# Patient Record
Sex: MALE | Race: WHITE | NOT HISPANIC OR LATINO | Employment: FULL TIME | URBAN - METROPOLITAN AREA
[De-identification: names, ages, dates, MRNs, and addresses within clinical notes are randomized per-mention and may not be internally consistent; named-entity substitution may affect disease eponyms.]

---

## 2019-02-26 ENCOUNTER — OFFICE VISIT (OUTPATIENT)
Dept: GASTROENTEROLOGY | Facility: CLINIC | Age: 64
End: 2019-02-26
Payer: COMMERCIAL

## 2019-02-26 VITALS
DIASTOLIC BLOOD PRESSURE: 80 MMHG | HEART RATE: 78 BPM | HEIGHT: 74 IN | BODY MASS INDEX: 30.8 KG/M2 | SYSTOLIC BLOOD PRESSURE: 136 MMHG | WEIGHT: 240 LBS

## 2019-02-26 DIAGNOSIS — Z12.11 SCREENING FOR COLON CANCER: ICD-10-CM

## 2019-02-26 DIAGNOSIS — K21.9 GASTROESOPHAGEAL REFLUX DISEASE, ESOPHAGITIS PRESENCE NOT SPECIFIED: ICD-10-CM

## 2019-02-26 DIAGNOSIS — K60.2 ANAL FISSURE: Primary | ICD-10-CM

## 2019-02-26 PROCEDURE — 99214 OFFICE O/P EST MOD 30 MIN: CPT | Performed by: INTERNAL MEDICINE

## 2019-02-26 RX ORDER — CALCIUM POLYCARBOPHIL 625 MG
TABLET ORAL 2 TIMES DAILY
COMMUNITY

## 2019-02-26 RX ORDER — PANTOPRAZOLE SODIUM 40 MG/1
20 TABLET, DELAYED RELEASE ORAL DAILY
COMMUNITY
End: 2020-02-12 | Stop reason: SDUPTHER

## 2019-02-26 RX ORDER — CARVEDILOL 6.25 MG/1
6.25 TABLET ORAL 2 TIMES DAILY WITH MEALS
COMMUNITY

## 2019-02-26 RX ORDER — MULTIVITAMIN
1 CAPSULE ORAL DAILY
COMMUNITY

## 2019-02-26 RX ORDER — CETIRIZINE HYDROCHLORIDE 10 MG/1
10 TABLET ORAL DAILY
COMMUNITY

## 2019-02-26 RX ORDER — UREA 10 %
500 LOTION (ML) TOPICAL DAILY
COMMUNITY

## 2019-02-26 RX ORDER — ROSUVASTATIN CALCIUM 20 MG/1
20 TABLET, COATED ORAL DAILY
COMMUNITY

## 2019-02-26 RX ORDER — ASCORBIC ACID 500 MG
500 TABLET ORAL DAILY
COMMUNITY

## 2019-02-26 RX ORDER — OMEGA-3-ACID ETHYL ESTERS 1 G/1
2 CAPSULE, LIQUID FILLED ORAL 2 TIMES DAILY
COMMUNITY

## 2019-02-26 RX ORDER — ASPIRIN 81 MG/1
81 TABLET ORAL DAILY
COMMUNITY

## 2019-02-26 RX ORDER — VITAMIN B COMPLEX
1 CAPSULE ORAL DAILY
COMMUNITY

## 2019-02-26 RX ORDER — LISINOPRIL 5 MG/1
40 TABLET ORAL DAILY
COMMUNITY

## 2019-02-26 RX ORDER — MELATONIN
1000 DAILY
COMMUNITY

## 2019-02-26 NOTE — PROGRESS NOTES
[facility] - Outpatient Follow-up Note  Gaby Benitez 59 y o  male MRN: [id number]  Encounter: [id number]      ASSESSMENT AND PLAN:      1  Anal fissure  No evidence on physical exam today in the office in feels better with nitroglycerin  He probably healed anal fissure I asked him to contact us if his symptoms worsen would re-evaluate him in 3 months  At this point given the negative colonoscopy in 2017 I do not think he needs repeat endoscopy  2  Screening for colon cancer  Negative two thousand seventeen 5 year recall with a history of polyps  3  Gastroesophageal reflux disease, esophagitis presence not specified  Stable symptoms plan at clinically controlled      Followup Appointment[de-identified]  3 month  ______________________________________________________________________    Chief Complaint   Patient presents with    Follow-up     Fissure       HPI:   generally feels better  No pain on defecation but sometimes has pain or pressure slightly before he goes  No rectal bleeding feels fine after a bowel movement his stool is not hard        Historical Information   Past Medical History:   Diagnosis Date    Coronary artery disease     GERD (gastroesophageal reflux disease)     Hyperlipidemia     Hypertension      Past Surgical History:   Procedure Laterality Date    CARPAL TUNNEL RELEASE      CORONARY STENT PLACEMENT       Social History   Social History     Substance and Sexual Activity   Alcohol Use Yes    Alcohol/week: 2 4 oz    Types: 4 Glasses of wine per week    Frequency: 2-3 times a week     Social History     Substance and Sexual Activity   Drug Use Not on file     Social History     Tobacco Use   Smoking Status Former Smoker   Smokeless Tobacco Never Used     Family History   Problem Relation Age of Onset    Heart disease Mother     Stroke Mother     Lung cancer Mother     Heart disease Father     Diabetes Father     Colon cancer Neg Hx     Colon polyps Neg Hx        Meds/Allergies       Current Outpatient Medications:     ascorbic acid (VITAMIN C) 500 mg tablet    aspirin (ECOTRIN LOW STRENGTH) 81 mg EC tablet    b complex vitamins capsule    Calcium Polycarbophil (FIBER) 625 MG TABS    carvedilol (COREG) 6 25 mg tablet    cetirizine (ZyrTEC) 10 mg tablet    cholecalciferol (VITAMIN D3) 1,000 units tablet    Coenzyme Q10 (EQL COQ10) 300 MG CAPS    lisinopril (ZESTRIL) 5 mg tablet    magnesium gluconate (MAGONATE) 500 mg tablet    Multiple Vitamin (MULTIVITAMIN) capsule    omega-3-acid ethyl esters (LOVAZA) 1 g capsule    pantoprazole (PROTONIX) 40 mg tablet    Probiotic Product (PROBIOTIC-10 PO)    rosuvastatin (CRESTOR) 20 MG tablet    Saw Palmetto-Phytosterols (PROSTATE SR) 160-250 MG CAPS    TURMERIC PO    Allergies   Allergen Reactions    Penicillins Hives       10 Point REVIEW OF SYSTEMS IS OTHERWISE NEGATIVE  Objective     Blood pressure 136/80, pulse 78, height 6' 1 5" (1 867 m), weight 109 kg (240 lb)  Body mass index is 31 23 kg/m²  PHYSICAL EXAM:    General Appearance:  Alert, cooperative, no distress  HEENT:  Normocephalic, atraumatic, anicteric  Neck: Supple, symmetrical, trachea midline  Lungs: Clear to auscultation bilaterally; no rales, rhonchi or wheezing; respirations unlabored   Heart: Regular rate and rhythm; no murmur, rub, or gallop  Abdomen:   Soft, non-tender, non-distended; normal bowel sounds; no masses, no organomegaly   Rectal:  Normal with no pain no masses no blood      Extremities:  No cyanosis, clubbing or edema   Skin:  No jaundice, rashes, or lesions   Lymph nodes: No palpable cervical lymphadenopathy     Lab Results:   No results found for: WBC, HGB, HCT, MCV, PLT  No results found for: NA, K, CL, CO2, ANIONGAP, BUN, CREATININE, GLUCOSE, GLUF, CALCIUM, CORRECTEDCA, AST, ALT, ALKPHOS, PROT, BILITOT, EGFR  No results found for: IRON, TIBC, FERRITIN  No results found for: LIPASE      Radiology Results:   No results found

## 2019-02-26 NOTE — LETTER
February 26, 2019     Asad Arechiga MD  [facility]    Patient: Annel Anderson   YOB: 1955   Date of Visit: 2/26/2019       Dear Dr Scarlett Bernal: Thank you for referring Annel Anderson to me for evaluation  Below are my notes for this consultation  If you have questions, please do not hesitate to call me  I look forward to following your patient along with you           Sincerely,        David Ramirez MD        CC: No Recipients

## 2019-05-16 ENCOUNTER — OFFICE VISIT (OUTPATIENT)
Dept: GASTROENTEROLOGY | Facility: CLINIC | Age: 64
End: 2019-05-16
Payer: COMMERCIAL

## 2019-05-16 VITALS
HEART RATE: 85 BPM | SYSTOLIC BLOOD PRESSURE: 138 MMHG | HEIGHT: 73 IN | WEIGHT: 236 LBS | BODY MASS INDEX: 31.28 KG/M2 | DIASTOLIC BLOOD PRESSURE: 82 MMHG

## 2019-05-16 DIAGNOSIS — Z12.11 SCREENING FOR COLON CANCER: ICD-10-CM

## 2019-05-16 DIAGNOSIS — K64.8 INTERNAL HEMORRHOIDS: ICD-10-CM

## 2019-05-16 DIAGNOSIS — K21.9 GASTROESOPHAGEAL REFLUX DISEASE, ESOPHAGITIS PRESENCE NOT SPECIFIED: Primary | ICD-10-CM

## 2019-05-16 PROCEDURE — 99213 OFFICE O/P EST LOW 20 MIN: CPT | Performed by: INTERNAL MEDICINE

## 2019-06-06 ENCOUNTER — OFFICE VISIT (OUTPATIENT)
Dept: GASTROENTEROLOGY | Facility: CLINIC | Age: 64
End: 2019-06-06
Payer: COMMERCIAL

## 2019-06-06 VITALS
WEIGHT: 234 LBS | BODY MASS INDEX: 30.03 KG/M2 | HEIGHT: 74 IN | DIASTOLIC BLOOD PRESSURE: 80 MMHG | SYSTOLIC BLOOD PRESSURE: 120 MMHG | HEART RATE: 80 BPM

## 2019-06-06 DIAGNOSIS — K64.8 INTERNAL HEMORRHOIDS: Primary | ICD-10-CM

## 2019-06-06 PROCEDURE — 46221 LIGATION OF HEMORRHOID(S): CPT | Performed by: INTERNAL MEDICINE

## 2019-06-21 ENCOUNTER — OFFICE VISIT (OUTPATIENT)
Dept: GASTROENTEROLOGY | Facility: CLINIC | Age: 64
End: 2019-06-21
Payer: COMMERCIAL

## 2019-06-21 VITALS
BODY MASS INDEX: 29.77 KG/M2 | HEART RATE: 84 BPM | WEIGHT: 232 LBS | HEIGHT: 74 IN | DIASTOLIC BLOOD PRESSURE: 78 MMHG | SYSTOLIC BLOOD PRESSURE: 110 MMHG

## 2019-06-21 DIAGNOSIS — K64.8 INTERNAL HEMORRHOIDS: Primary | ICD-10-CM

## 2019-06-21 PROCEDURE — 46221 LIGATION OF HEMORRHOID(S): CPT | Performed by: INTERNAL MEDICINE

## 2020-02-12 ENCOUNTER — TELEPHONE (OUTPATIENT)
Dept: GASTROENTEROLOGY | Facility: CLINIC | Age: 65
End: 2020-02-12

## 2020-02-12 DIAGNOSIS — K21.9 GASTROESOPHAGEAL REFLUX DISEASE, ESOPHAGITIS PRESENCE NOT SPECIFIED: Primary | ICD-10-CM

## 2020-02-12 RX ORDER — PANTOPRAZOLE SODIUM 20 MG/1
20 TABLET, DELAYED RELEASE ORAL DAILY
Qty: 30 TABLET | Refills: 3 | Status: SHIPPED | OUTPATIENT
Start: 2020-02-12 | End: 2020-06-18

## 2020-02-12 NOTE — TELEPHONE ENCOUNTER
Pt calling to req refill of generic Protonix; changing pharmacies/asks to delete [facility] and switch to [facility] [address]   If ques  cell [phone]

## 2020-02-12 NOTE — TELEPHONE ENCOUNTER
Called pt back, he is presently taking the Pantoprazole 20 mg daily and feeling well  He would like to try to decrease the medication even more  He will try 2 days on and 1 day off for a while and then go to every other day  Sending to Dr Jo Thornton as an Barney Mcclelland and to [facility] for medication approval as Dr Jo Thornton is not available this week

## 2020-06-10 DIAGNOSIS — K21.9 GASTROESOPHAGEAL REFLUX DISEASE, ESOPHAGITIS PRESENCE NOT SPECIFIED: ICD-10-CM

## 2020-06-18 RX ORDER — PANTOPRAZOLE SODIUM 20 MG/1
TABLET, DELAYED RELEASE ORAL
Qty: 30 TABLET | Refills: 3 | Status: SHIPPED | OUTPATIENT
Start: 2020-06-18

## 2023-07-21 ENCOUNTER — TELEPHONE (OUTPATIENT)
Age: 68
End: 2023-07-21

## 2023-07-21 ENCOUNTER — PREP FOR PROCEDURE (OUTPATIENT)
Age: 68
End: 2023-07-21

## 2023-07-21 DIAGNOSIS — Z86.010 HISTORY OF COLON POLYPS: Primary | ICD-10-CM

## 2023-07-21 NOTE — TELEPHONE ENCOUNTER
23  Screened by: Kan Briones        Pre- Screenin.9KG BMI  Has patient been referred for a routine screening Colonoscopy? no  Is the patient between 43-73 years old? yes      Previous Colonoscopy yes   If yes:    Date:     [facility]: [facility]    Reason: SCREENING      SCHEDULING STAFF: If the patient is between 45yrs-49yrs, please advise patient to confirm benefits/coverage with their insurance company for a routine screening colonoscopy, some insurance carriers will only cover at [address] [place] [address] or older. If the patient is over 66years old, please schedule an office visit. Does the patient want to see a Gastroenterologist prior to their procedure OR are they having any GI symptoms? no    Has the patient been hospitalized or had abdominal surgery in the past 6 months? no    Does the patient use supplemental oxygen? no    Does the patient take Coumadin, Lovenox, Plavix, Elliquis, Xarelto, or other blood thinning medication? no    Has the patient had a stroke, cardiac event, or stent placed in the past year? no    SCHEDULING STAFF: If patient answers NO to above questions, then schedule procedure. If patient answers YES to above questions, then schedule office appointment. If patient is between 45yrs - 49yrs, please advise patient that we will have to confirm benefits & coverage with their insurance company for a routine screening colonoscopy.     PASSED OA

## 2023-07-21 NOTE — TELEPHONE ENCOUNTER
Scheduled date of colonoscopy (as of today): 09/05/2023  Physician performing colonoscopy: JAIRO   Location of colonoscopy: [address] [place] [address]  Bowel prep reviewed with patient: GOLYTELY  Instructions reviewed with patient by: LYDIA  Clearances: N/A

## 2023-09-15 ENCOUNTER — HOSPITAL ENCOUNTER (OUTPATIENT)
Dept: GASTROENTEROLOGY | Facility: AMBULATORY SURGERY CENTER | Age: 68
Discharge: HOME | End: 2023-09-15
Payer: MEDICARE

## 2023-09-15 ENCOUNTER — ANESTHESIA (OUTPATIENT)
Dept: GASTROENTEROLOGY | Facility: AMBULATORY SURGERY CENTER | Age: 68
End: 2023-09-15

## 2023-09-15 ENCOUNTER — ANESTHESIA EVENT (OUTPATIENT)
Dept: GASTROENTEROLOGY | Facility: AMBULATORY SURGERY CENTER | Age: 68
End: 2023-09-15

## 2023-09-15 VITALS
HEIGHT: 73 IN | WEIGHT: 235 LBS | SYSTOLIC BLOOD PRESSURE: 138 MMHG | RESPIRATION RATE: 22 BRPM | BODY MASS INDEX: 31.14 KG/M2 | OXYGEN SATURATION: 97 % | TEMPERATURE: 98 F | HEART RATE: 69 BPM | DIASTOLIC BLOOD PRESSURE: 71 MMHG

## 2023-09-15 DIAGNOSIS — Z86.010 HISTORY OF COLON POLYPS: ICD-10-CM

## 2023-09-15 PROCEDURE — G0105 COLORECTAL SCRN; HI RISK IND: HCPCS | Performed by: INTERNAL MEDICINE

## 2023-09-15 RX ORDER — NITROGLYCERIN 0.4 MG/1
TABLET SUBLINGUAL
COMMUNITY

## 2023-09-15 RX ORDER — SODIUM CHLORIDE, SODIUM LACTATE, POTASSIUM CHLORIDE, CALCIUM CHLORIDE 600; 310; 30; 20 MG/100ML; MG/100ML; MG/100ML; MG/100ML
INJECTION, SOLUTION INTRAVENOUS CONTINUOUS PRN
Status: DISCONTINUED | OUTPATIENT
Start: 2023-09-15 | End: 2023-09-15

## 2023-09-15 RX ORDER — LISINOPRIL 20 MG/1
20 TABLET ORAL DAILY
COMMUNITY
Start: 2023-07-29

## 2023-09-15 RX ORDER — PROPOFOL 10 MG/ML
INJECTION, EMULSION INTRAVENOUS AS NEEDED
Status: DISCONTINUED | OUTPATIENT
Start: 2023-09-15 | End: 2023-09-15

## 2023-09-15 RX ORDER — SODIUM CHLORIDE, SODIUM LACTATE, POTASSIUM CHLORIDE, CALCIUM CHLORIDE 600; 310; 30; 20 MG/100ML; MG/100ML; MG/100ML; MG/100ML
50 INJECTION, SOLUTION INTRAVENOUS CONTINUOUS
Status: DISCONTINUED | OUTPATIENT
Start: 2023-09-15 | End: 2023-09-19 | Stop reason: HOSPADM

## 2023-09-15 RX ADMIN — SODIUM CHLORIDE, SODIUM LACTATE, POTASSIUM CHLORIDE, CALCIUM CHLORIDE: 600; 310; 30; 20 INJECTION, SOLUTION INTRAVENOUS at 10:48

## 2023-09-15 RX ADMIN — PROPOFOL 50 MG: 10 INJECTION, EMULSION INTRAVENOUS at 11:03

## 2023-09-15 RX ADMIN — PROPOFOL 100 MG: 10 INJECTION, EMULSION INTRAVENOUS at 10:57

## 2023-09-15 RX ADMIN — PROPOFOL 250 MG: 10 INJECTION, EMULSION INTRAVENOUS at 10:52

## 2023-09-15 RX ADMIN — PROPOFOL 50 MG: 10 INJECTION, EMULSION INTRAVENOUS at 10:55

## 2023-09-15 RX ADMIN — SODIUM CHLORIDE, SODIUM LACTATE, POTASSIUM CHLORIDE, CALCIUM CHLORIDE 50 ML/HR: 600; 310; 30; 20 INJECTION, SOLUTION INTRAVENOUS at 10:43

## 2023-09-15 NOTE — H&P
History and Physical - [facility]  Imelda Fernández 80 y.o. male MRN: [id number]    HPI: Imelda Fernández is a 80y.o. year old male who presents for colonoscopy for history of polyps. REVIEW OF SYSTEMS: Per the HPI, and otherwise unremarkable.     Historical Information   Past Medical History:   Diagnosis Date   • Coronary artery disease    • GERD (gastroesophageal reflux disease)    • Hyperlipidemia    • Hypertension      Past Surgical History:   Procedure Laterality Date   • CARPAL TUNNEL RELEASE Bilateral    • CORONARY STENT PLACEMENT      x2   • EYE SURGERY Left      Social History   Social History     Substance and Sexual Activity   Alcohol Use Yes   • Alcohol/week: 4.0 standard drinks of alcohol   • Types: 4 Glasses of wine per week     Social History     Substance and Sexual Activity   Drug Use Never     Social History     Tobacco Use   Smoking Status Former   Smokeless Tobacco Never     Family History   Problem Relation Age of Onset   • Heart disease Mother    • Stroke Mother    • Lung cancer Mother    • Heart disease Father    • Diabetes Father    • Colon cancer Neg Hx    • Colon polyps Neg Hx        Meds/Allergies       Current Outpatient Medications:   •  ascorbic acid (VITAMIN C) 500 mg tablet  •  aspirin (ECOTRIN LOW STRENGTH) 81 mg EC tablet  •  b complex vitamins capsule  •  Calcium Polycarbophil (FIBER) 625 MG TABS  •  cetirizine (ZyrTEC) 10 mg tablet  •  cholecalciferol (VITAMIN D3) 1,000 units tablet  •  Coenzyme Q10 (EQL COQ10) 300 MG CAPS  •  lisinopril (ZESTRIL) 20 mg tablet  •  magnesium gluconate (MAGONATE) 500 mg tablet  •  Multiple Vitamin (MULTIVITAMIN) capsule  •  omega-3-acid ethyl esters (LOVAZA) 1 g capsule  •  pantoprazole (PROTONIX) 20 mg tablet  •  Probiotic Product (PROBIOTIC-10 PO)  •  rosuvastatin (CRESTOR) 20 MG tablet  •  Saw Palmetto-Phytosterols (PROSTATE SR) 160-250 MG CAPS  •  TURMERIC PO  •  carvedilol (COREG) 6.25 mg tablet  •  lisinopril (ZESTRIL) 5 mg tablet  •  nitroglycerin (NITROSTAT) 0.4 mg SL tablet  •  polyethylene glycol (GOLYTELY) 4000 mL solution    Current Facility-Administered Medications:   •  lactated ringers infusion, 50 mL/hr, Intravenous, Continuous, 50 mL/hr at 09/15/23 1043    Allergies   Allergen Reactions   • Penicillins Hives       Objective     /84   Pulse 67   Temp 98 °F (36.7 °C) (Temporal)   Resp 15   Ht 6' 1" (1.854 m)   Wt 107 kg (235 lb)   SpO2 98%   BMI 31.00 kg/m²     PHYSICAL EXAM    Gen: NAD AAOx3  Head: Normocephalic, Atraumatic  CV: S1S2 RRR no m/r/g  CHEST: Clear b/l no c/r/w  ABD: soft, +BS NT/ND  EXT: no edema    ASSESSMENT/PLAN:  This is a 80y.o. year old male here for colonoscopy, and he is stable and optimized for his procedure.

## 2023-09-15 NOTE — ANESTHESIA PREPROCEDURE EVALUATION
Procedure:  COLONOSCOPY    Relevant Problems   No relevant active problems   HTN/CAD, AF, GERD     Physical Exam    Airway    Mallampati score: I  TM Distance: >3 FB  Neck ROM: full     Dental       Cardiovascular  Cardiovascular exam normal    Pulmonary  Pulmonary exam normal     Other Findings        Anesthesia Plan  ASA Score- 2     Anesthesia Type- IV sedation with anesthesia with ASA Monitors. Additional Monitors:   Airway Plan:           Plan Factors-Exercise tolerance (METS): >4 METS. Chart reviewed. EKG reviewed. Imaging results reviewed. Existing labs reviewed. Patient summary reviewed. Induction- intravenous. Postoperative Plan- Plan for postoperative opioid use. Planned trial extubation    Informed Consent- Anesthetic plan and risks discussed with patient. I personally reviewed this patient with the CRNA. Discussed and agreed on the Anesthesia Plan with the CRNA. Madonna Tate